# Patient Record
Sex: FEMALE | URBAN - METROPOLITAN AREA
[De-identification: names, ages, dates, MRNs, and addresses within clinical notes are randomized per-mention and may not be internally consistent; named-entity substitution may affect disease eponyms.]

---

## 2022-11-10 ENCOUNTER — APPOINTMENT (OUTPATIENT)
Dept: URBAN - METROPOLITAN AREA CLINIC 197 | Age: 57
Setting detail: DERMATOLOGY
End: 2022-11-17

## 2022-11-10 DIAGNOSIS — L98.8 OTHER SPECIFIED DISORDERS OF THE SKIN AND SUBCUTANEOUS TISSUE: ICD-10-CM

## 2022-11-10 PROCEDURE — OTHER COUNSELING: OTHER

## 2022-11-10 PROCEDURE — OTHER OTHER (COSMETIC): OTHER

## 2022-11-10 PROCEDURE — OTHER BOTOX (U OR CC): OTHER

## 2022-11-10 PROCEDURE — OTHER BOTOX (U OR CC) ADDITIVE: OTHER

## 2022-11-10 ASSESSMENT — LOCATION SIMPLE DESCRIPTION DERM
LOCATION SIMPLE: LEFT FRONTALIS
LOCATION SIMPLE: LEFT CORRUGATOR SUPERCILIARIS
LOCATION SIMPLE: RIGHT FRONTALIS
LOCATION SIMPLE: RIGHT CORRUGATOR SUPERCILIARIS
LOCATION SIMPLE: PROCERUS

## 2022-11-10 ASSESSMENT — LOCATION DETAILED DESCRIPTION DERM
LOCATION DETAILED: LEFT FRONTALIS
LOCATION DETAILED: LEFT CORRUGATOR SUPERCILIARIS
LOCATION DETAILED: RIGHT CORRUGATOR SUPERCILIARIS
LOCATION DETAILED: PROCERUS
LOCATION DETAILED: RIGHT FRONTALIS

## 2022-11-10 ASSESSMENT — LOCATION ZONE DERM: LOCATION ZONE: FACE

## 2022-11-10 NOTE — PROCEDURE: BOTOX (U OR CC)
Price Per Unit Or Per Cc In $ (Use Numbers Only, No Special Characters Or $): 11
Detail Level: Detailed
Post-Care Instructions: Please exaggerate the injected muscle at least 5 times within the next hour by frowning, raising your eyebrows, and/or scowling.\\n\\nIt is very important not to lie down for 4 hours after the injection. This includes lying down for facials, exercising, massages, doctor’s appointments, napping, etc.\\n\\nPlease do not exercise or heavily exert for the next 24 hours. \\n\\nPlease do not be discouraged if you don’t notice a change right away. Neuromodulators can take 3-5 days to take affect and up to 2 weeks to be at its \"final\" stage.\\n\\nPlease wait the full 2 week waiting period before scheduling a refinement appointment. \\n\\nPatient given post care handout prior to leaving office.
Dilution (U/0.1 Cc): 4
Consent: Written consent obtained. Risks include but not limited to lid/brow ptosis, bruising, swelling, diplopia, temporary effect, incomplete chemical denervation. Patient voiced understanding and had no additional questions prior to treatment.
Measure In Units Or Cc's?: units
Show Inventory Tab: Hide
Quantity Per Injection Site (Units): 2

## 2022-11-28 ENCOUNTER — APPOINTMENT (OUTPATIENT)
Dept: URBAN - METROPOLITAN AREA CLINIC 197 | Age: 57
Setting detail: DERMATOLOGY
End: 2022-12-05

## 2022-11-28 DIAGNOSIS — Z41.9 ENCOUNTER FOR PROCEDURE FOR PURPOSES OTHER THAN REMEDYING HEALTH STATE, UNSPECIFIED: ICD-10-CM

## 2022-11-28 PROCEDURE — OTHER COSMETIC FOLLOW-UP: OTHER

## 2022-11-28 PROCEDURE — OTHER OTHER (COSMETIC): OTHER

## 2022-11-28 PROCEDURE — OTHER COSMETIC CONSULTATION: FILLERS: OTHER

## 2022-11-28 NOTE — PROCEDURE: COSMETIC FOLLOW-UP
Side Effects Or Complications: None
Detail Level: Zone
Global Improvement: Marked
Patient Satisfaction: Very pleased
Treatment (Optional): Botox

## 2022-11-28 NOTE — PROCEDURE: OTHER (COSMETIC)
Detail Level: Zone
Other (Free Text): We discussed in detail neuromodulators for the orbicularis oculi and expected outcomes. Additionally, the pt wanted to know more about dermal fillers. She has concern for the nasolabial folds as well as looking \"tired\". I RD cheek filler versus nasolabial fold filler for treatment of this area as well as pricing and longevity. She opts to think on it and will schedule an appointment at her convenience. Discussed that for cheeks 1-2 ccs may be needed for a satisfactory correction. She voiced understanding and asked questions.\\n\\nQuoted $800 for Voluma until 2/23\\nQuoted $650 for Ultra until 2/23

## 2023-02-10 ENCOUNTER — APPOINTMENT (OUTPATIENT)
Dept: URBAN - METROPOLITAN AREA CLINIC 197 | Age: 58
Setting detail: DERMATOLOGY
End: 2023-02-14

## 2023-02-10 DIAGNOSIS — L98.8 OTHER SPECIFIED DISORDERS OF THE SKIN AND SUBCUTANEOUS TISSUE: ICD-10-CM

## 2023-02-10 PROCEDURE — OTHER JEUVEAU (U OR CC): OTHER

## 2023-02-10 PROCEDURE — OTHER JEUVEAU (U OR CC) ADDITIVE: OTHER

## 2023-02-10 PROCEDURE — OTHER COUNSELING: OTHER

## 2023-02-10 ASSESSMENT — LOCATION SIMPLE DESCRIPTION DERM
LOCATION SIMPLE: RIGHT FRONTALIS
LOCATION SIMPLE: LEFT CORRUGATOR SUPERCILIARIS
LOCATION SIMPLE: PROCERUS
LOCATION SIMPLE: LEFT FRONTALIS
LOCATION SIMPLE: RIGHT CORRUGATOR SUPERCILIARIS

## 2023-02-10 ASSESSMENT — LOCATION DETAILED DESCRIPTION DERM
LOCATION DETAILED: PROCERUS
LOCATION DETAILED: LEFT CORRUGATOR SUPERCILIARIS
LOCATION DETAILED: LEFT FRONTALIS
LOCATION DETAILED: RIGHT CORRUGATOR SUPERCILIARIS
LOCATION DETAILED: RIGHT FRONTALIS

## 2023-02-10 ASSESSMENT — LOCATION ZONE DERM: LOCATION ZONE: FACE

## 2023-02-10 NOTE — PROCEDURE: JEUVEAU (U OR CC)
Post-Care Instructions: Written consent obtained. Risks include but not limited to lid/brow ptosis, bruising, swelling, diplopia, temporary effect, incomplete chemical denervation, and allergic reaction. -Avoid touching or physical pressure on the treated area.\\n-Expect swelling and bruising. Apply a pack of ice for relief using light pressure only.\\n-For the first four hours, do not lay down.\\n-Do not drink alcohol or use NSAIDs for 24 hours following your treatment.\\n-Avoid sun exposure, tanning beds, saunas, and hot tubs for 24 hours.\\n-Avoid workouts or any activities raising the blood pressure or hard rate for the first 24 hours.\\n- Botox can take 3-5 days to take affect and up to 2 weeks to be at its \"final\" stage. Make sure to schedule your two week appointment for any refinements.\\nContact the office for concerns. For emergencies, go to your local emergency department.\\n\\nPaper post care instructions were giving to patient prior to leaving office.\\n\\nPatient tolerated procedure well with no immediate adverse effects. Patient left the office AOx3.

## 2023-03-02 ENCOUNTER — APPOINTMENT (OUTPATIENT)
Dept: URBAN - METROPOLITAN AREA CLINIC 197 | Age: 58
Setting detail: DERMATOLOGY
End: 2023-03-07

## 2023-03-02 DIAGNOSIS — L98.8 OTHER SPECIFIED DISORDERS OF THE SKIN AND SUBCUTANEOUS TISSUE: ICD-10-CM

## 2023-03-02 PROCEDURE — OTHER COUNSELING: OTHER

## 2023-03-02 PROCEDURE — OTHER JEUVEAU (U OR CC): OTHER

## 2023-03-02 PROCEDURE — OTHER JEUVEAU (U OR CC) ADDITIVE: OTHER

## 2023-04-13 ENCOUNTER — APPOINTMENT (OUTPATIENT)
Dept: URBAN - METROPOLITAN AREA CLINIC 197 | Age: 58
Setting detail: DERMATOLOGY
End: 2023-04-17

## 2023-04-13 DIAGNOSIS — L90.8 OTHER ATROPHIC DISORDERS OF SKIN: ICD-10-CM

## 2023-04-13 PROCEDURE — OTHER COUNSELING: OTHER

## 2023-04-13 PROCEDURE — OTHER VI PEEL: OTHER

## 2023-04-13 ASSESSMENT — LOCATION ZONE DERM: LOCATION ZONE: FACE

## 2023-04-13 ASSESSMENT — LOCATION DETAILED DESCRIPTION DERM: LOCATION DETAILED: LEFT INFERIOR MEDIAL FOREHEAD

## 2023-04-13 ASSESSMENT — LOCATION SIMPLE DESCRIPTION DERM: LOCATION SIMPLE: LEFT FOREHEAD

## 2023-04-13 NOTE — PROCEDURE: VI PEEL
Price (Use Numbers Only, No Special Characters Or $): 241 Price (Use Numbers Only, No Special Characters Or $): 832

## 2023-04-13 NOTE — PROCEDURE: VI PEEL
Post Peel Care: In depth review of post care instructions and restrictions. The patient was given VI post care kit to take home including detailed written instructions for post care, SPF, post treatment repair cream, and a list of things to avoid during during the peel process. \\n\\n\\nADDITIONAL TIPS\\n? Use only the kit of Vi Products during the next 7 - 10 days. If you run out use a gentle non-retinol / acid cleanser / moisturizer.\\n? No excessive sweating for 24-72 hours following treatment.\\n? During the time before and after you start peeling; do not excessively soak treated areas. This will cause premature peeling.\\n? Avoid sunlight as much as possible during the peeling phase. Always use SPF of 30 or higher daily. Broad rimmed hats only, no baseball caps or visors.\\n? Do not have any facial treatments (even waxing) for at least 2 weeks after you have finished peeling.\\n? Do not use anything on your face that is likely to burn or sting.\\n? No picking or pulling the peeling areas, you may trip the area with scissors that have been cleansed with alcohol.

## 2023-05-11 ENCOUNTER — APPOINTMENT (OUTPATIENT)
Dept: URBAN - METROPOLITAN AREA CLINIC 197 | Age: 58
Setting detail: DERMATOLOGY
End: 2023-05-25

## 2023-05-11 DIAGNOSIS — L98.8 OTHER SPECIFIED DISORDERS OF THE SKIN AND SUBCUTANEOUS TISSUE: ICD-10-CM

## 2023-05-11 PROCEDURE — OTHER BOTOX (U OR CC) ADDITIVE: OTHER

## 2023-05-11 PROCEDURE — OTHER COUNSELING: OTHER

## 2023-05-11 PROCEDURE — OTHER BOTOX (U OR CC): OTHER

## 2023-05-11 PROCEDURE — OTHER OTHER: OTHER

## 2023-05-11 ASSESSMENT — LOCATION SIMPLE DESCRIPTION DERM
LOCATION SIMPLE: LEFT FRONTALIS
LOCATION SIMPLE: PROCERUS
LOCATION SIMPLE: RIGHT FRONTALIS
LOCATION SIMPLE: RIGHT CORRUGATOR SUPERCILIARIS
LOCATION SIMPLE: LEFT CORRUGATOR SUPERCILIARIS

## 2023-05-11 ASSESSMENT — LOCATION ZONE DERM: LOCATION ZONE: FACE

## 2023-05-11 ASSESSMENT — LOCATION DETAILED DESCRIPTION DERM
LOCATION DETAILED: LEFT FRONTALIS
LOCATION DETAILED: RIGHT CORRUGATOR SUPERCILIARIS
LOCATION DETAILED: PROCERUS
LOCATION DETAILED: LEFT CORRUGATOR SUPERCILIARIS
LOCATION DETAILED: RIGHT FRONTALIS

## 2023-05-11 NOTE — PROCEDURE: OTHER
Render Risk Assessment In Note?: no
Detail Level: Zone
Note Text (......Xxx Chief Complaint.): This diagnosis correlates with the
Other (Free Text): Pt reports pruritis post jeuveau. Will test c Botox today and see if patient experiences post tx pruritis

## 2023-07-27 ENCOUNTER — APPOINTMENT (OUTPATIENT)
Dept: URBAN - METROPOLITAN AREA CLINIC 197 | Age: 58
Setting detail: DERMATOLOGY
End: 2023-08-01

## 2023-07-27 DIAGNOSIS — Z41.9 ENCOUNTER FOR PROCEDURE FOR PURPOSES OTHER THAN REMEDYING HEALTH STATE, UNSPECIFIED: ICD-10-CM

## 2023-07-27 PROCEDURE — OTHER COUNSELING: OTHER

## 2023-07-27 PROCEDURE — OTHER JEUVEAU (U OR CC): OTHER

## 2023-07-27 NOTE — PROCEDURE: JEUVEAU (U OR CC)
Price (Use Numbers Only, No Special Characters Or $): 550 Price (Use Numbers Only, No Special Characters Or $): 012

## 2023-07-27 NOTE — PROCEDURE: JEUVEAU (U OR CC)
Consent: Written consent obtained. Risks include but not limited hypersensitivity reaction, lid/brow ptosis, bruising, swelling, diplopia, temporary effect, incomplete chemical denervation.\\n\\nBotulinum toxin and neuromodulator/neurotoxin treatment were discussed in detail with the patient. Including risks, benefits, expected outcomes, MOA, and overall patient goals. Pt verbalized understanding throughout the discussion and procedure and was encouraged to ask any questions.\\n\\n-\\nProcedure Details:\\n\\nPlease see attached map for locations, injection amounts, and treatment notes/memorandums.\\n\\nArea was prepared with 70% isopropyl alcohol and hypochlorous acid (Puracyn). \\n\\nScanned medical history, medication reconciliation, know allergies, and past treatments (if applicable) as well as photo consent were reviewed with patient prior to procedure.\\n\\nPt advised that should photo consent for marketing/publication be declined, photos will still be obtained a securely stored in their EHR records.

## 2023-07-27 NOTE — PROCEDURE: JEUVEAU (U OR CC)
Post-Care Instructions: -Avoid touching or physical pressure on the treated area.\\n-Expect swelling and bruising. Apply a pack of ice for relief using light pressure only.\\n-For the first four hours, do not lay down.\\n-Do not drink alcohol or use NSAIDs for 24 hours following your treatment to reduce risk of additional bruising.\\n-Avoid sun exposure, tanning beds, saunas, and hot tubs for 24 hours.\\n-Avoid workouts or any activities raising the blood pressure or hard rate for the first 24 hours.\\n- Neuromodulators can take 3-5 days to take affect and up to 2 weeks to be at its \"final\" stage. RD the 14th day. Encouraged to schedule two week appointment for any refinements.\\n\\nContact the office for concerns. Reviewed for emergencies, go to your local emergency department.\\n\\nPaper post care instructions were given to patient prior to leaving office.\\n\\nPatient tolerated procedure well with no immediate adverse effects. Patient left the office AOx3.\\n\\nEncouraged to contact the office c any questions.

## 2023-11-01 ENCOUNTER — APPOINTMENT (OUTPATIENT)
Dept: URBAN - METROPOLITAN AREA CLINIC 197 | Age: 58
Setting detail: DERMATOLOGY
End: 2023-11-01

## 2023-11-01 DIAGNOSIS — Z41.9 ENCOUNTER FOR PROCEDURE FOR PURPOSES OTHER THAN REMEDYING HEALTH STATE, UNSPECIFIED: ICD-10-CM

## 2023-11-01 PROCEDURE — OTHER COUNSELING: OTHER

## 2023-11-01 PROCEDURE — OTHER JEUVEAU (U OR CC): OTHER

## 2023-11-01 PROCEDURE — OTHER OTHER (COSMETIC): OTHER

## 2023-11-01 NOTE — PROCEDURE: OTHER (COSMETIC)
Detail Level: Zone
Other (Free Text): I explained to pt that I miscalculated at her last visit and, in order to accommodate her, I cut the difference in half, that is what I charged her today, but I did give her a “to do the same thing the next time” and at the normal price point without my error, I did scan that into the record.
Other (Free Text): We skipped doing around the eyes today due to budgetary concerns.

## 2023-11-01 NOTE — HPI: COSMETIC (JEUVEAU)
Additional History: She has a budget today as normal and wants to treat all the areas for under budget which we reviewed that in order not to compromise doing we will try to come up with a plan that treats areas effectively.  Concerns in order of priority are forehead, followed by angry lines, followed by periorbital area.  She is due to have hip surgery in January, she is nervous about that, so I may not see her until mid Spring.

## 2024-04-17 ENCOUNTER — APPOINTMENT (OUTPATIENT)
Dept: URBAN - METROPOLITAN AREA CLINIC 197 | Age: 59
Setting detail: DERMATOLOGY
End: 2024-04-17

## 2024-04-17 DIAGNOSIS — Z41.9 ENCOUNTER FOR PROCEDURE FOR PURPOSES OTHER THAN REMEDYING HEALTH STATE, UNSPECIFIED: ICD-10-CM

## 2024-04-17 PROCEDURE — OTHER VI PEEL: OTHER

## 2024-04-17 NOTE — PROCEDURE: VI PEEL
Price (Use Numbers Only, No Special Characters Or $): 012
Post Peel Care: After the procedure, the patient was instructed not to wash the treated area for 6-8 hours or manually remove dead skin when the peeling process starts. Patient may use OTC hydrocortisone cream for itching.  Patient instructed to use the provided Retin-A wipes on the treated area on the 1st and 2nd nights.
Detail Level: Zone
Consent: Prior to the procedure, written consent was obtained and risks were reviewed, including but not limited to: redness, peeling, blistering, pigmentary change, scarring, infection, and pain. Patient is aware multiple treatments may be necessary to achieve the desired outcome.
Treatment Number: 0
Chemical Peel: VI Precision
Prep: The treated area was degreased with pre-peel cleanser, and vaseline was applied for protection of mucous membranes.